# Patient Record
Sex: FEMALE | Race: WHITE
[De-identification: names, ages, dates, MRNs, and addresses within clinical notes are randomized per-mention and may not be internally consistent; named-entity substitution may affect disease eponyms.]

---

## 2018-04-24 ENCOUNTER — HOSPITAL ENCOUNTER (INPATIENT)
Age: 33
LOS: 3 days | Discharge: HOME | End: 2018-04-27

## 2018-04-24 ENCOUNTER — HOSPITAL ENCOUNTER (INPATIENT)
Dept: HOSPITAL 91 - L-D | Age: 33
LOS: 3 days | Discharge: HOME | End: 2018-04-27
Payer: COMMERCIAL

## 2018-04-24 DIAGNOSIS — O44.03: Primary | ICD-10-CM

## 2018-04-24 DIAGNOSIS — Z3A.38: ICD-10-CM

## 2018-04-24 LAB
ADD MAN DIFF?: NO
AMPHETAMINE/METHAMPHETAMINE: NEGATIVE
BARBITURATES: NEGATIVE
BASOPHIL #: 0.1 10^3/UL (ref 0–0.1)
BASOPHILS %: 0.6 % (ref 0–2)
BENZODIAZEPINES: NEGATIVE
CANNABINOIDS: NEGATIVE
COCAINE: NEGATIVE
EOSINOPHILS #: 0.1 10^3/UL (ref 0–0.5)
EOSINOPHILS %: 0.9 % (ref 0–7)
HEMATOCRIT: 37.3 % (ref 37–47)
HEMOGLOBIN: 12 G/DL (ref 12–16)
HEPATITIS B SURFACE ANTIGEN: NEGATIVE
INR: 0.92
LYMPHOCYTES #: 1.6 10^3/UL (ref 0.8–2.9)
LYMPHOCYTES %: 16.5 % (ref 15–51)
MEAN CORPUSCULAR HEMOGLOBIN: 27 PG (ref 29–33)
MEAN CORPUSCULAR HGB CONC: 32.2 G/DL (ref 32–37)
MEAN CORPUSCULAR VOLUME: 84 FL (ref 82–101)
MEAN PLATELET VOLUME: 11.6 FL (ref 7.4–10.4)
MONOCYTE #: 0.6 10^3/UL (ref 0.3–0.9)
MONOCYTES %: 5.7 % (ref 0–11)
NEUTROPHIL #: 7.3 10^3/UL (ref 1.6–7.5)
NEUTROPHILS %: 74.8 % (ref 39–77)
NUCLEATED RED BLOOD CELLS #: 0 10^3/UL (ref 0–0)
NUCLEATED RED BLOOD CELLS%: 0 /100WBC (ref 0–0)
OPIATES: NEGATIVE
PARTIAL THROMBOPLASTIN TIME: 28.6 SEC (ref 25–35)
PLATELET COUNT: 238 10^3/UL (ref 140–415)
PROTIME: 12.4 SEC (ref 11.9–14.9)
PT RATIO: 1
RAPID PLASMA REAGIN: NONREACTIVE
RED BLOOD COUNT: 4.44 10^6/UL (ref 4.2–5.4)
RED CELL DISTRIBUTION WIDTH: 13.7 % (ref 11.5–14.5)
WHITE BLOOD COUNT: 9.7 10^3/UL (ref 4.8–10.8)

## 2018-04-24 PROCEDURE — 80307 DRUG TEST PRSMV CHEM ANLYZR: CPT

## 2018-04-24 PROCEDURE — 86920 COMPATIBILITY TEST SPIN: CPT

## 2018-04-24 PROCEDURE — 86901 BLOOD TYPING SEROLOGIC RH(D): CPT

## 2018-04-24 PROCEDURE — 86592 SYPHILIS TEST NON-TREP QUAL: CPT

## 2018-04-24 PROCEDURE — 86850 RBC ANTIBODY SCREEN: CPT

## 2018-04-24 PROCEDURE — 85730 THROMBOPLASTIN TIME PARTIAL: CPT

## 2018-04-24 PROCEDURE — 86900 BLOOD TYPING SEROLOGIC ABO: CPT

## 2018-04-24 PROCEDURE — 85025 COMPLETE CBC W/AUTO DIFF WBC: CPT

## 2018-04-24 PROCEDURE — 87340 HEPATITIS B SURFACE AG IA: CPT

## 2018-04-24 PROCEDURE — 85610 PROTHROMBIN TIME: CPT

## 2018-04-24 RX ADMIN — PYRIDOXINE HYDROCHLORIDE 1 MLS/HR: 100 INJECTION, SOLUTION INTRAMUSCULAR; INTRAVENOUS at 13:18

## 2018-04-24 RX ADMIN — Medication 1 MLS/HR: at 17:13

## 2018-04-24 RX ADMIN — CEFAZOLIN 1 MLS/HR: 1 INJECTION, POWDER, FOR SOLUTION INTRAMUSCULAR; INTRAVENOUS at 20:36

## 2018-04-24 RX ADMIN — ONDANSETRON HYDROCHLORIDE 1 MG: 2 INJECTION, SOLUTION INTRAMUSCULAR; INTRAVENOUS at 17:11

## 2018-04-24 RX ADMIN — CEFAZOLIN SODIUM 1 MLS/HR: 2 SOLUTION INTRAVENOUS at 12:30

## 2018-04-24 RX ADMIN — ONDANSETRON HYDROCHLORIDE 1 MG: 2 INJECTION, SOLUTION INTRAMUSCULAR; INTRAVENOUS at 12:58

## 2018-04-24 RX ADMIN — SODIUM CITRATE AND CITRIC ACID MONOHYDRATE 1 ML: 500; 334 SOLUTION ORAL at 12:58

## 2018-04-24 RX ADMIN — ONDANSETRON HYDROCHLORIDE 1 MG: 2 INJECTION, SOLUTION INTRAMUSCULAR; INTRAVENOUS at 21:09

## 2018-04-24 RX ADMIN — Medication 1 MLS/HR: at 15:01

## 2018-04-24 RX ADMIN — Medication 1 MLS/HR: at 19:50

## 2018-04-25 LAB
ADD MAN DIFF?: NO
BASOPHIL #: 0 10^3/UL (ref 0–0.1)
BASOPHILS %: 0.1 % (ref 0–2)
EOSINOPHILS #: 0 10^3/UL (ref 0–0.5)
EOSINOPHILS %: 0.1 % (ref 0–7)
HEMATOCRIT: 31.3 % (ref 37–47)
HEMOGLOBIN: 10.1 G/DL (ref 12–16)
LYMPHOCYTES #: 1.3 10^3/UL (ref 0.8–2.9)
LYMPHOCYTES %: 9.2 % (ref 15–51)
MEAN CORPUSCULAR HEMOGLOBIN: 27.2 PG (ref 29–33)
MEAN CORPUSCULAR HGB CONC: 32.3 G/DL (ref 32–37)
MEAN CORPUSCULAR VOLUME: 84.1 FL (ref 82–101)
MEAN PLATELET VOLUME: 11.2 FL (ref 7.4–10.4)
MONOCYTE #: 0.9 10^3/UL (ref 0.3–0.9)
MONOCYTES %: 6 % (ref 0–11)
NEUTROPHIL #: 12.1 10^3/UL (ref 1.6–7.5)
NEUTROPHILS %: 83.9 % (ref 39–77)
NUCLEATED RED BLOOD CELLS #: 0 10^3/UL (ref 0–0)
NUCLEATED RED BLOOD CELLS%: 0 /100WBC (ref 0–0)
PLATELET COUNT: 238 10^3/UL (ref 140–415)
RED BLOOD COUNT: 3.72 10^6/UL (ref 4.2–5.4)
RED CELL DISTRIBUTION WIDTH: 13.7 % (ref 11.5–14.5)
WHITE BLOOD COUNT: 14.4 10^3/UL (ref 4.8–10.8)

## 2018-04-25 RX ADMIN — PYRIDOXINE HYDROCHLORIDE 1 MLS/HR: 100 INJECTION, SOLUTION INTRAMUSCULAR; INTRAVENOUS at 10:48

## 2018-04-25 RX ADMIN — Medication 1 MLS/HR: at 02:05

## 2018-04-25 RX ADMIN — Medication 1 MLS/HR: at 00:56

## 2018-04-25 RX ADMIN — DOCUSATE SODIUM AND SENNOSIDES 1 TAB: 8.6; 5 TABLET, FILM COATED ORAL at 09:00

## 2018-04-25 RX ADMIN — IBUPROFEN 1 MG: 600 TABLET ORAL at 17:40

## 2018-04-25 RX ADMIN — Medication 1 MLS/HR: at 10:05

## 2018-04-25 RX ADMIN — Medication 1 MLS/HR: at 05:40

## 2018-04-25 RX ADMIN — KETOROLAC TROMETHAMINE 1 MG: 30 INJECTION, SOLUTION INTRAMUSCULAR at 05:43

## 2018-04-25 RX ADMIN — DOCUSATE SODIUM AND SENNOSIDES 1 TAB: 8.6; 5 TABLET, FILM COATED ORAL at 21:57

## 2018-04-25 RX ADMIN — Medication 1 MLS/HR: at 14:05

## 2018-04-26 RX ADMIN — DOCUSATE SODIUM AND SENNOSIDES 1 TAB: 8.6; 5 TABLET, FILM COATED ORAL at 21:06

## 2018-04-26 RX ADMIN — IBUPROFEN 1 MG: 600 TABLET ORAL at 00:00

## 2018-04-26 RX ADMIN — IBUPROFEN 1 MG: 600 TABLET ORAL at 05:13

## 2018-04-26 RX ADMIN — DOCUSATE SODIUM AND SENNOSIDES 1 TAB: 8.6; 5 TABLET, FILM COATED ORAL at 09:00

## 2018-04-26 RX ADMIN — IBUPROFEN 1 MG: 600 TABLET ORAL at 11:24

## 2018-04-26 RX ADMIN — MEASLES, MUMPS, AND RUBELLA VIRUS VACCINE LIVE 1 ML: 1000; 12500; 1000 INJECTION, POWDER, LYOPHILIZED, FOR SUSPENSION SUBCUTANEOUS at 17:57

## 2018-04-26 RX ADMIN — IBUPROFEN 1 MG: 600 TABLET ORAL at 17:21

## 2018-04-26 RX ADMIN — Medication 1 APPLIC: at 09:03

## 2018-04-26 RX ADMIN — SODIUM PHOSPHATE, DIBASIC AND SODIUM PHOSPHATE, MONOBASIC 1 ML: 7; 19 ENEMA RECTAL at 22:03

## 2018-04-27 RX ADMIN — IBUPROFEN 1 MG: 600 TABLET ORAL at 12:00

## 2018-04-27 RX ADMIN — HYDROCODONE BITARTRATE AND ACETAMINOPHEN 1 TAB: 5; 325 TABLET ORAL at 08:15

## 2018-04-27 RX ADMIN — SODIUM PHOSPHATE, DIBASIC AND SODIUM PHOSPHATE, MONOBASIC 1 ML: 7; 19 ENEMA RECTAL at 09:30

## 2018-04-27 RX ADMIN — DOCUSATE SODIUM AND SENNOSIDES 1 TAB: 8.6; 5 TABLET, FILM COATED ORAL at 08:14

## 2018-04-27 RX ADMIN — CLOSTRIDIUM TETANI TOXOID ANTIGEN (FORMALDEHYDE INACTIVATED), CORYNEBACTERIUM DIPHTHERIAE TOXOID ANTIGEN (FORMALDEHYDE INACTIVATED), BORDETELLA PERTUSSIS TOXOID ANTIGEN (GLUTARALDEHYDE INACTIVATED), BORDETELLA PERTUSSIS FILAMENTOUS HEMAGGLUTININ ANTIGEN (FORMALDEHYDE INACTIVATED), BORDETELLA PERTUSSIS PERTACTIN ANTIGEN, AND BORDETELLA PERTUSSIS FIMBRIAE 2/3 ANTIGEN 1 ML: 5; 2; 2.5; 5; 3; 5 INJECTION, SUSPENSION INTRAMUSCULAR at 08:27

## 2018-04-27 RX ADMIN — IBUPROFEN 1 MG: 600 TABLET ORAL at 06:04

## 2018-04-27 RX ADMIN — IBUPROFEN 1 MG: 600 TABLET ORAL at 00:24

## 2021-05-17 ENCOUNTER — HOSPITAL ENCOUNTER (EMERGENCY)
Dept: HOSPITAL 12 - ER | Age: 36
Discharge: HOME | End: 2021-05-17
Payer: MEDICAID

## 2021-05-17 VITALS — BODY MASS INDEX: 26.03 KG/M2 | WEIGHT: 162 LBS | HEIGHT: 66 IN
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
## 2021-05-17 DIAGNOSIS — S42.292A: Primary | ICD-10-CM

## 2021-05-17 DIAGNOSIS — R82.5: ICD-10-CM
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
## 2021-05-17 LAB
AMPHETAMINES UR QL SCN>1000 NG/ML: POSITIVE
APPEARANCE UR: (no result)
BILIRUB UR QL STRIP: NEGATIVE
COCAINE UR QL SCN: NEGATIVE
COLOR UR: YELLOW
DEPRECATED SQUAMOUS URNS QL MICRO: (no result) /HPF
ETHANOL SERPL-MCNC: < 3 MG/DL (ref 0–0)
GLUCOSE UR STRIP-MCNC: NEGATIVE MG/DL
HGB UR QL STRIP: NEGATIVE
KETONES UR STRIP-MCNC: NEGATIVE MG/DL
LEUKOCYTE ESTERASE UR QL STRIP: NEGATIVE
MUCOUS THREADS URNS QL MICRO: (no result) /LPF
NITRITE UR QL STRIP: NEGATIVE
OPIATES UR QL SCN: NEGATIVE
PCP UR QL SCN>25 NG/ML: NEGATIVE
PH UR STRIP: 5.5 [PH] (ref 5–8)
RBC #/AREA URNS HPF: (no result) /HPF (ref 0–3)
SP GR UR STRIP: >=1.03 (ref 1–1.03)
THC UR QL SCN>50 NG/ML: NEGATIVE
UROBILINOGEN UR STRIP-MCNC: 0.2 E.U./DL
WBC #/AREA URNS HPF: (no result) /HPF
WBC #/AREA URNS HPF: (no result) /HPF (ref 0–3)

## 2021-05-17 PROCEDURE — A4663 DIALYSIS BLOOD PRESSURE CUFF: HCPCS

## 2021-05-17 PROCEDURE — G0480 DRUG TEST DEF 1-7 CLASSES: HCPCS

## 2021-05-17 NOTE — NUR
Per Dr. Mathew pt stable for discharge. Pt refused DC vitals and CD with 
xray. Instructed pt to follow up with primary care provider for referral for 
ortho specialist. Verbalized understanding. Left ER in stable condition.

## 2021-05-17 NOTE — NUR
Spoke with Donald Argueta and provided further details. Per Donald Argueta 
referral number is [id number]. Frederic,  made aware.

-------------------------------------------------------------------------------

Addendum: 05/17/21 at 1557 by DANIEL

-------------------------------------------------------------------------------

DCFS report made to Donald Argueta
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
## 2021-05-17 NOTE — NUR
Pt has infant child. No injuries noted to pt's female child and is behaving 
appropriately with mother.

-------------------------------------------------------------------------------

Addendum: 05/17/21 at 1144 by DANIEL

-------------------------------------------------------------------------------

Dr. Mathew was consulted if patient's child should be assessed. Per MD states 
that pt's child was in her car seat and does not need to be examined.

## 2021-05-18 NOTE — NUR
[place] from [facility] called for follow up on the 
case. London confirmed that no action required from [facility] at this 
point.

-------------------------------------------------------------------------------

Addendum: 05/18/21 at 1017 by ESEQUIEL

-------------------------------------------------------------------------------

.
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
